# Patient Record
Sex: FEMALE | Race: WHITE | NOT HISPANIC OR LATINO | URBAN - METROPOLITAN AREA
[De-identification: names, ages, dates, MRNs, and addresses within clinical notes are randomized per-mention and may not be internally consistent; named-entity substitution may affect disease eponyms.]

---
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
## 2021-02-26 ENCOUNTER — OFFICE VISIT (OUTPATIENT)
Dept: MEDICAL GROUP | Facility: PHYSICIAN GROUP | Age: 21
End: 2021-02-26
Payer: OTHER GOVERNMENT

## 2021-02-26 VITALS
OXYGEN SATURATION: 97 % | TEMPERATURE: 99.9 F | BODY MASS INDEX: 24.71 KG/M2 | SYSTOLIC BLOOD PRESSURE: 118 MMHG | HEART RATE: 97 BPM | WEIGHT: 163 LBS | HEIGHT: 68 IN | RESPIRATION RATE: 12 BRPM | DIASTOLIC BLOOD PRESSURE: 62 MMHG

## 2021-02-26 DIAGNOSIS — D22.9 SKIN MOLE: ICD-10-CM

## 2021-02-26 DIAGNOSIS — Z12.83 SCREENING FOR SKIN CANCER: ICD-10-CM

## 2021-02-26 PROCEDURE — 99213 OFFICE O/P EST LOW 20 MIN: CPT | Performed by: NURSE PRACTITIONER

## 2021-02-26 ASSESSMENT — PATIENT HEALTH QUESTIONNAIRE - PHQ9: CLINICAL INTERPRETATION OF PHQ2 SCORE: 0

## 2021-02-26 ASSESSMENT — FIBROSIS 4 INDEX: FIB4 SCORE: 0.45

## 2021-02-27 NOTE — ASSESSMENT & PLAN NOTE
New problem. Generalized skin moles, as well as 3 large skin moles, 2 in the upper back and 1 in left mid and quadrant. No change in color, no bleeding, no rapid increase in size. Pt states these were first noted in childhood. No personal or FH of skin malignancy. Denies fevers, unplanned wt loss.

## 2021-02-27 NOTE — PROGRESS NOTES
Chief Complaint   Patient presents with   • Referral Needed     Derm referral in [facility]       HISTORY OF PRESENT ILLNESS: Patient is a 20 y.o. female, established patient who presents today to discuss medical problems as listed below:    Health Maintenance:  COMPLETED     Skin mole  New problem. Generalized skin moles, as well as 3 large skin moles, 2 in the upper back and 1 in left mid and quadrant. No change in color, no bleeding, no rapid increase in size. Pt states these were first noted in childhood. No personal or FH of skin malignancy. Denies fevers, unplanned wt loss.       Patient Active Problem List    Diagnosis Date Noted   • Skin mole 02/26/2021   • Vitamin D deficiency 08/20/2019   • Contraception management 08/13/2019   • Annual physical exam 08/13/2019   • Encounter to establish care with new doctor 08/13/2019   • History of anemia 08/13/2019        Allergies: Amoxicillin and Penicillins    Current Outpatient Medications   Medication Sig Dispense Refill   • etonogestrel (NEXPLANON) 68 MG Implant implant Inject 68 mg as instructed.     • Fexofenadine HCl (ALLEGRA ALLERGY PO) Take  by mouth.     • ibuprofen (MOTRIN) 600 MG Tab Take 600 mg by mouth.     • NS SOLN 60 mL with albuterol 2.5 mg/0.5 mL NEBU 10 mL Inhale  by mouth.       No current facility-administered medications for this visit.       Social History     Tobacco Use   • Smoking status: Never Smoker   • Smokeless tobacco: Never Used   Substance Use Topics   • Alcohol use: Never   • Drug use: Never     Social History     Social History Narrative   • Not on file       Family History   Problem Relation Age of Onset   • Diabetes Mother    • Hyperlipidemia Mother    • Depression Mother    • Cancer Maternal Grandmother         breast cancer   • Thyroid Maternal Grandmother    • Diabetes Maternal Grandfather    • Hyperlipidemia Maternal Grandfather    • Hypertension Maternal Grandfather    • Cancer Paternal Grandfather         prostate cancer  "      Allergies, past medical history, past surgical history, family history, social history reviewed and updated.    Review of Systems:     - Constitutional: Negative for fever, chills, unexpected weight change, and fatigue/generalized weakness.    - Respiratory: Negative for cough, sputum production, chest congestion, dyspnea, wheezing, and crackles.      - Cardiovascular: Negative for chest pain, palpitations, orthopnea, and bilateral lower extremity edema.     - Skin: Multiple skin moles.    - Psychiatric/Behavioral: Negative for depression, suicidal/homicidal ideation and memory loss.      All other systems reviewed and are negative    Exam:    /62   Pulse 97   Temp 37.7 °C (99.9 °F) (Temporal)   Resp 12   Ht 1.727 m (5' 8\")   Wt 73.9 kg (163 lb)   SpO2 97%   BMI 24.78 kg/m²  Body mass index is 24.78 kg/m².    Physical Exam:  Constitutional: Well-developed and well-nourished. Not diaphoretic. No distress.   Skin: Multiple skin moles, light to dark brown, consistent in color, no bleeding, with well-defined borders.  cardiovascular: Regular rate and rhythm, S1 and S2 without murmur, rubs, or gallops.    Chest: Effort normal. Clear to auscultation throughout. No adventitious sounds.   Neurological: Alert and oriented x 3.   Psychiatric:  Behavior, mood, and affect are appropriate.  MA/nursing note and vitals reviewed.    My total time spent caring for the patient on the day of the encounter was 15 minutes.   This does not include time spent on separately billable procedures/tests.     Assessment/Plan:  1. Skin mole  - REFERRAL TO DERMATOLOGY    2. Screening for skin cancer  - REFERRAL TO DERMATOLOGY       Discussed with patient possible alternative diagnoses, pt is to take all medications as prescribed. If symptoms persist FU w/PCP, if symptoms worsen go to emergency room. If experiencing any side effects from prescribed medications reports to the office immediately or go to emergency room.  Reviewed " indication, dosage, usage and potential adverse effects of prescribed medications. Reviewed risks and benefits of treatment plan. Patient verbalizes understanding of all instruction and verbally agrees to plan.    No follow-ups on file. annual

## 2021-03-25 ENCOUNTER — TELEMEDICINE (OUTPATIENT)
Dept: MEDICAL GROUP | Facility: PHYSICIAN GROUP | Age: 21
End: 2021-03-25
Payer: OTHER GOVERNMENT

## 2021-03-25 DIAGNOSIS — F41.8 SITUATIONAL ANXIETY: ICD-10-CM

## 2021-03-25 PROCEDURE — 99213 OFFICE O/P EST LOW 20 MIN: CPT | Mod: 95,CR | Performed by: NURSE PRACTITIONER

## 2021-03-25 NOTE — PROGRESS NOTES
Telemedicine Visit: Established Patient     This encounter was conducted via Zoom.   Verbal consent was obtained. Patient's identity was verified.    Subjective:     Chief Complaint   Patient presents with   • Stress     Elo Huerta is a 20 y.o. female presenting for evaluation and management of following problems:    Situational anxiety  New problem. Patient reports situational anxiety related to high pressure at school. 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 She is an aspiring student earning criminal justice degree. Patient is non-smoker no EtOH. She does admit to supportive environment and family and friends. She does not have any systemic issues, no trouble sleeping, no GI issues. Admits to diet not being the best. Patient reports being very busy and not having too much time for extracurricular activities. She denies depression, SI/HI. Has not done therapy in the past.   No CP, dyspnea, palpitations, nausea, constipation diarrhea, no HA. Lab data from 2019 with low vitamin D.      ROS   Denies any recent fevers or chills. No nausea or vomiting. No chest pains or shortness of breath.     Allergies   Allergen Reactions   • Amoxicillin    • Penicillins        Current medicines (including changes today)  Current Outpatient Medications   Medication Sig Dispense Refill   • etonogestrel (NEXPLANON) 68 MG Implant implant Inject 68 mg as instructed.     • Fexofenadine HCl (ALLEGRA ALLERGY PO) Take  by mouth.     • ibuprofen (MOTRIN) 600 MG Tab Take 600 mg by mouth.     • NS SOLN 60 mL with albuterol 2.5 mg/0.5 mL NEBU 10 mL Inhale  by mouth.       No current facility-administered medications for this visit.       Patient Active Problem List    Diagnosis Date Noted   • Situational anxiety 03/25/2021   • Skin mole 02/26/2021   • Vitamin D deficiency 08/20/2019   • Contraception management 08/13/2019   • Annual physical exam 08/13/2019   • Encounter to  establish care with new doctor 08/13/2019   • History of anemia 08/13/2019       Family History   Problem Relation Age of Onset   • Diabetes Mother    • Hyperlipidemia Mother    • Depression Mother    • Cancer Maternal Grandmother         breast cancer   • Thyroid Maternal Grandmother    • Diabetes Maternal Grandfather    • Hyperlipidemia Maternal Grandfather    • Hypertension Maternal Grandfather    • Cancer Paternal Grandfather         prostate cancer       She  has no past medical history on file.  She  has no past surgical history on file.       Objective:   Vitals obtained by patient:  There were no vitals taken for this visit.     Physical Exam:  General: No acute distress. Well nourished.   HEENT: EOM grossly intact, no scleral icterus, no pharyngeal erythema.   Neck:  No JVD noted at 90 degrees, trachea midline  CVS: Pulse as reported by patient, no visible LE edema.  Resp: Unlabored respiratory effort, no cough or audible wheeze  MSK/Ext: No clubbing or cyanosis visible appreciated.  Skin: No rashes in visible areas.  Neurological: AOx3. CN III-XII grossly intact. No focal deficits.     LABS: 2019  results reviewed and discussed with the patient, questions answered.    My total time spent caring for the patient on the day of the encounter was 15 minutes.   This does not include time spent on separately billable procedures/tests.   Assessment and Plan:   1. Situational anxiety  Uncontrolled, stable. Educated on possible etiology. Based on current symptomology, I believe therapy would be the first-line intervention and potentially very beneficial, no need for prescription at this time. Also encourage patient to continue to take vitamin D supplementation. Also taking needed breaks for self-care such as physical activity to preference and tolerance. Encouraged to improve diet, increase intake of vegetables, fruits, healthy proteins okay, fibers and fats. Encourage patient to explore [facility] to  select preferred therapy in addition to placed referral. Will follow up as needed.  - REFERRAL TO PSYCHOLOGY       Follow-up: No follow-ups on file.

## 2021-03-25 NOTE — ASSESSMENT & PLAN NOTE
New problem. Patient reports situational anxiety related to high pressure at school. 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 Patient is non-smoker no EtOH. She does admit to supportive environment and family and friends. She does not have any systemic issues, no trouble sleeping, no GI issues. Admits to diet not being the best. Patient reports being very busy and not having too much time for extracurricular activities. She denies depression, SI/HI. Has not done therapy in the past.   No CP, dyspnea, palpitations, nausea, constipation diarrhea, no HA. Lab data from 2019 with low vitamin D.

## 2022-06-21 ENCOUNTER — OFFICE VISIT (OUTPATIENT)
Dept: MEDICAL GROUP | Facility: PHYSICIAN GROUP | Age: 22
End: 2022-06-21
Payer: OTHER GOVERNMENT

## 2022-06-21 VITALS
WEIGHT: 146 LBS | HEIGHT: 68 IN | OXYGEN SATURATION: 96 % | SYSTOLIC BLOOD PRESSURE: 108 MMHG | BODY MASS INDEX: 22.13 KG/M2 | DIASTOLIC BLOOD PRESSURE: 68 MMHG | RESPIRATION RATE: 12 BRPM | TEMPERATURE: 98.6 F | HEART RATE: 110 BPM

## 2022-06-21 DIAGNOSIS — R00.0 SINUS TACHYCARDIA: ICD-10-CM

## 2022-06-21 DIAGNOSIS — R42 DIZZINESS: ICD-10-CM

## 2022-06-21 DIAGNOSIS — N93.9 UTERINE BLEEDING: ICD-10-CM

## 2022-06-21 PROBLEM — Z30.017 NEXPLANON INSERTION: Status: ACTIVE | Noted: 2017-08-29

## 2022-06-21 PROBLEM — Z97.5 NEXPLANON IN PLACE: Status: ACTIVE | Noted: 2020-08-09

## 2022-06-21 PROCEDURE — 99214 OFFICE O/P EST MOD 30 MIN: CPT | Performed by: NURSE PRACTITIONER

## 2022-06-21 RX ORDER — PROGESTERONE 100 MG/1
100 CAPSULE ORAL NIGHTLY
Qty: 30 CAPSULE | Refills: 1 | Status: CANCELLED | OUTPATIENT
Start: 2022-06-21

## 2022-06-21 RX ORDER — IBUPROFEN 200 MG
400 TABLET ORAL
COMMUNITY
End: 2022-06-21

## 2022-06-21 ASSESSMENT — PATIENT HEALTH QUESTIONNAIRE - PHQ9: CLINICAL INTERPRETATION OF PHQ2 SCORE: 0

## 2022-06-21 NOTE — PROGRESS NOTES
Chief Complaint   Patient presents with   • Menstrual Problem     Stopped taking premarin, last period lasted a month prescribed on the 30th and ended on the 5th        HISTORY OF PRESENT ILLNESS: Patient is a 21 y.o. female, established patient who presents today to discuss medical problems as listed below:    Uterine bleeding  New problem.  Uterine bleeding for over 4 weeks starts like it progresses to heavy bleeding.  Patient is established with OB/GYN in [place].  Received prescription of Premarin on 5/30/2022, which stopped the bleeding.  Nexplanon in place, second Nexplanon insertion 2019, to be removed in August.  Doing well now except occasional dizziness.  Blood pressure within normal limit, elevated pulse at 110, no palpitations, dyspnea or CP.      Patient Active Problem List    Diagnosis Date Noted   • Uterine bleeding 06/21/2022   • Situational anxiety 03/25/2021   • Skin mole 02/26/2021   • Nexplanon in place 08/09/2020   • Vitamin D deficiency 08/20/2019   • Contraception management 08/13/2019   • Annual physical exam 08/13/2019   • Encounter to establish care with new doctor 08/13/2019   • History of anemia 08/13/2019   • Nexplanon insertion 08/29/2017   • Allergic rhinitis 03/26/2011   • Herpes simplex virus (HSV) infection 03/26/2011        Allergies: Amoxicillin and Penicillins    Current Outpatient Medications   Medication Sig Dispense Refill   • etonogestrel (NEXPLANON) 68 MG Implant implant Inject 68 mg as instructed.     • Fexofenadine HCl (ALLEGRA ALLERGY PO) Take  by mouth.     • ibuprofen (MOTRIN) 600 MG Tab Take 600 mg by mouth.     • NS SOLN 60 mL with albuterol 2.5 mg/0.5 mL NEBU 10 mL Inhale  by mouth.       No current facility-administered medications for this visit.       Social History     Tobacco Use   • Smoking status: Never Smoker   • Smokeless tobacco: Never Used   Vaping Use   • Vaping Use: Never used   Substance Use Topics   • Alcohol use: Never   • Drug use: Never  "    Social History     Social History Narrative   • Not on file       Family History   Problem Relation Age of Onset   • Diabetes Mother    • Hyperlipidemia Mother    • Depression Mother    • Cancer Maternal Grandmother         breast cancer   • Thyroid Maternal Grandmother    • Diabetes Maternal Grandfather    • Hyperlipidemia Maternal Grandfather    • Hypertension Maternal Grandfather    • Cancer Paternal Grandfather         prostate cancer       Allergies, past medical history, past surgical history, family history, social history reviewed and updated.    Review of Systems:     - Constitutional: Negative for fever, chills, unexpected weight change, and fatigue/generalized weakness.     - Respiratory: Negative for cough, sputum production, chest congestion, dyspnea, wheezing, and crackles.      - Cardiovascular: Negative for chest pain, palpitations, orthopnea, and bilateral lower extremity edema.     - Psychiatric/Behavioral: Negative for depression, suicidal/homicidal ideation and memory loss.      All other systems reviewed and are negative    Exam:    /68   Pulse (!) 110   Temp 37 °C (98.6 °F) (Temporal)   Resp 12   Ht 1.727 m (5' 8\")   Wt 66.2 kg (146 lb)   SpO2 96%   BMI 22.20 kg/m²  Body mass index is 22.2 kg/m².    Physical Exam:  Constitutional: Well-developed and well-nourished. Not diaphoretic. No distress.   Cardiovascular: Regular rate and rhythm, S1 and S2 without murmur, rubs, or gallops.    Chest: Effort normal. Clear to auscultation throughout. No adventitious sounds.   Neurological: Alert and oriented x 3.   Psychiatric:  Behavior, mood, and affect are appropriate.  MA/nursing note and vitals reviewed.    Assessment/Plan:  1. Uterine bleeding  Resolved.  Due for Nexplanon replacement.  Patient will be following up with OB in August for Nexplanon removal  - CBC WITHOUT DIFFERENTIAL; Future    2. Dizziness  Likely secondary to anemia post uterine bleeding.  Advised iron rich foods with " vitamin C, steak with oranges, pumpkin seed and appropriate seed butter etc.  - CBC WITHOUT DIFFERENTIAL; Future    3. Sinus tachycardia  Likely secondary to anemia posterior bleeding.  As discussed in 2.  - CBC WITHOUT DIFFERENTIAL; Future       Discussed with patient possible alternative diagnoses, pt is to take all medications as prescribed. If symptoms persist FU w/PCP, if symptoms worsen go to emergency room. If experiencing any side effects from prescribed medications reports to the office immediately or go to emergency room.  Reviewed indication, dosage, usage and potential adverse effects of prescribed medications. Reviewed risks and benefits of treatment plan. Patient verbalizes understanding of all instruction and verbally agrees to plan.    No follow-ups on file. annual, PRN

## 2022-06-21 NOTE — ASSESSMENT & PLAN NOTE
New problem.  Uterine bleeding for over 4 weeks starts like it progresses to heavy bleeding.  Patient is established with OB/GYN in [place].  Received prescription of Premarin on 5/30/2022, which stopped the bleeding.  Nexplanon in place, second Nexplanon insertion 2019, to be removed in August.  Doing well now except occasional dizziness.  Blood pressure within normal limit, elevated pulse at 110, no palpitations, dyspnea or CP.

## 2022-08-26 ENCOUNTER — OFFICE VISIT (OUTPATIENT)
Dept: MEDICAL GROUP | Facility: PHYSICIAN GROUP | Age: 22
End: 2022-08-26
Payer: OTHER GOVERNMENT

## 2022-08-26 VITALS — WEIGHT: 144.6 LBS | BODY MASS INDEX: 21.92 KG/M2 | RESPIRATION RATE: 12 BRPM | HEIGHT: 68 IN | TEMPERATURE: 97.9 F

## 2022-08-26 DIAGNOSIS — Z30.46 NEXPLANON REMOVAL: ICD-10-CM

## 2022-08-26 PROCEDURE — 99214 OFFICE O/P EST MOD 30 MIN: CPT | Performed by: NURSE PRACTITIONER

## 2022-08-27 NOTE — PROGRESS NOTES
Chief Complaint   Patient presents with    Follow-Up     Taking out nexplanon        HISTORY OF PRESENT ILLNESS: Patient is a 21 y.o. female, established patient who presents today to discuss medical problems as listed below:      Nexplanon removal  Nexplanon removal procedure today.  Unable to remove after multiple attempts due to initial deep implantation.      Patient Active Problem List    Diagnosis Date Noted    Nexplanon removal 08/26/2022    Uterine bleeding 06/21/2022    Situational anxiety 03/25/2021    Skin mole 02/26/2021    Nexplanon in place 08/09/2020    Vitamin D deficiency 08/20/2019    Contraception management 08/13/2019    Annual physical exam 08/13/2019    Encounter to establish care with new doctor 08/13/2019    History of anemia 08/13/2019    Nexplanon insertion 08/29/2017    Allergic rhinitis 03/26/2011    Herpes simplex virus (HSV) infection 03/26/2011        Allergies: Amoxicillin and Penicillins    Current Outpatient Medications   Medication Sig Dispense Refill    Fexofenadine HCl (ALLEGRA ALLERGY PO) Take  by mouth.      ibuprofen (MOTRIN) 600 MG Tab Take 600 mg by mouth.      NS SOLN 60 mL with albuterol 2.5 mg/0.5 mL NEBU 10 mL Inhale  by mouth.       No current facility-administered medications for this visit.       Social History     Tobacco Use    Smoking status: Never    Smokeless tobacco: Never   Vaping Use    Vaping Use: Never used   Substance Use Topics    Alcohol use: Never    Drug use: Never     Social History     Social History Narrative    Not on file       Family History   Problem Relation Age of Onset    Diabetes Mother     Hyperlipidemia Mother     Depression Mother     Cancer Maternal Grandmother         breast cancer    Thyroid Maternal Grandmother     Diabetes Maternal Grandfather     Hyperlipidemia Maternal Grandfather     Hypertension Maternal Grandfather     Cancer Paternal Grandfather         prostate cancer       Allergies, past medical history, past surgical  "history, family history, social history reviewed and updated.    Review of Systems:     - Constitutional: Negative for fever, chills, unexpected weight change, and fatigue/generalized weakness.     - Respiratory: Negative for cough, sputum production, chest congestion, dyspnea, wheezing, and crackles.      - Cardiovascular: Negative for chest pain, palpitations, orthopnea, and bilateral lower extremity edema.     - Psychiatric/Behavioral: Negative for depression, suicidal/homicidal ideation and memory loss.      All other systems reviewed and are negative    Exam:    BP (P) 110/68   Pulse (P) 99   Temp 36.6 °C (97.9 °F) (Temporal)   Resp 12   Ht 1.727 m (5' 8\")   Wt 65.6 kg (144 lb 9.6 oz)   SpO2 (P) 99%   BMI 21.99 kg/m²  Body mass index is 21.99 kg/m².    Physical Exam:  Constitutional: Well-developed and well-nourished. Not diaphoretic. No distress.   Cardiovascular: Regular rate and rhythm, S1 and S2 without murmur, rubs, or gallops.    Chest: Effort normal. Clear to auscultation throughout. No adventitious sounds. Neurological: Alert and oriented x 3.   Psychiatric:  Behavior, mood, and affect are appropriate.  MA/nursing note and vitals reviewed.    Assessment/Plan:  1. Nexplanon removal  Attempted to remove Nexplanon which is originally deeply planted.  Able to visualize the Nexplanon location for some difficulty.  Area was cleaned with metastatic inserted onto the  Tip.  Ventricular incision was made, unable to locate the tip of the Nexplanon.  Second attempt unsuccessful.  Assistance of colleague Dr. Rodriguez was obtained, third attempt unsuccessful.  The site was cleaned and a small pressure dressing of Steri-Strips was applied.  Patient referral was placed for general surgery in [place] as patient is returning to school in [place].  - Referral to General Surgery       Discussed with patient possible alternative diagnoses, pt is to take all medications as prescribed. If symptoms persist FU w/PCP, " if symptoms worsen go to emergency room. If experiencing any side effects from prescribed medications reports to the office immediately or go to emergency room.  Reviewed indication, dosage, usage and potential adverse effects of prescribed medications. Reviewed risks and benefits of treatment plan. Patient verbalizes understanding of all instruction and verbally agrees to plan.    No follow-ups on file. Annual , PRN

## 2022-08-27 NOTE — ASSESSMENT & PLAN NOTE
Nexplanon removal procedure today.  Unable to remove after multiple attempts due to initial deep implantation.

## 2022-08-31 ENCOUNTER — TELEPHONE (OUTPATIENT)
Dept: MEDICAL GROUP | Facility: PHYSICIAN GROUP | Age: 22
End: 2022-08-31
Payer: OTHER GOVERNMENT

## 2022-08-31 NOTE — TELEPHONE ENCOUNTER
Patient lvm stating the hospital referred too is not scheduling her due to the referral not having a specific doctor named to handle the situation at the hospital.

## 2022-09-01 DIAGNOSIS — Z30.46 NEXPLANON REMOVAL: ICD-10-CM

## 2022-09-01 NOTE — TELEPHONE ENCOUNTER
Pt called stating unable to schedule appointment to get her nexplanon removed. She states referral has to be sent to a specific Doctor.

## 2022-09-01 NOTE — TELEPHONE ENCOUNTER
Called pt to let her know Lucille had resent the referral to general surgery correcting the error from the last referral.

## 2022-09-02 DIAGNOSIS — Z30.46 NEXPLANON REMOVAL: ICD-10-CM
